# Patient Record
Sex: FEMALE | Race: WHITE | NOT HISPANIC OR LATINO | Employment: FULL TIME | URBAN - METROPOLITAN AREA
[De-identification: names, ages, dates, MRNs, and addresses within clinical notes are randomized per-mention and may not be internally consistent; named-entity substitution may affect disease eponyms.]

---

## 2019-10-30 ENCOUNTER — OFFICE VISIT (OUTPATIENT)
Dept: FAMILY MEDICINE CLINIC | Facility: CLINIC | Age: 24
End: 2019-10-30

## 2019-10-30 VITALS
OXYGEN SATURATION: 98 % | BODY MASS INDEX: 39.45 KG/M2 | WEIGHT: 236.8 LBS | HEIGHT: 65 IN | TEMPERATURE: 98.7 F | SYSTOLIC BLOOD PRESSURE: 133 MMHG | HEART RATE: 64 BPM | DIASTOLIC BLOOD PRESSURE: 89 MMHG

## 2019-10-30 DIAGNOSIS — E66.09 CLASS 2 OBESITY DUE TO EXCESS CALORIES WITHOUT SERIOUS COMORBIDITY WITH BODY MASS INDEX (BMI) OF 39.0 TO 39.9 IN ADULT: ICD-10-CM

## 2019-10-30 DIAGNOSIS — L20.82 FLEXURAL ECZEMA: ICD-10-CM

## 2019-10-30 DIAGNOSIS — R10.11 RUQ ABDOMINAL PAIN: Primary | ICD-10-CM

## 2019-10-30 DIAGNOSIS — H02.9 LESION OF EYELID: ICD-10-CM

## 2019-10-30 PROCEDURE — 99214 OFFICE O/P EST MOD 30 MIN: CPT | Performed by: FAMILY MEDICINE

## 2019-10-30 NOTE — PROGRESS NOTES
Subjective:     Sara Powers is a 24 y.o. female who presents for  Chief Complaint   Patient presents with   • Abdominal Pain     abdominal pain and thinks it may be her gallbladder   • Mass     she has bumps under her skin on her arm and in her groin- not painful but getting bigger- she also has a bump near her right eye- she is not sure if they are all the same thing       This is a new patient to me.    Patient presents to the office at the suggestion of her OB/GYN.  Patient reports right upper quadrant abdominal pain with palpation.  Pain is otherwise unnoticeable.    Additionally patient reports a painless, singular umbilicated lesion on her right inner eye lid that has been present for over a year. Denies any changes to the lesion or drainage from the lesion.       Abdominal Pain   This is a new problem. The current episode started more than 1 month ago (2 month). The onset quality is sudden. The problem occurs intermittently. The problem has been unchanged. The pain is located in the RUQ. The quality of the pain is aching (with palpation of abdomen). The abdominal pain does not radiate. Pertinent negatives include no arthralgias, constipation, diarrhea, dysuria, fever, hematuria, myalgias, nausea or vomiting. The pain is aggravated by palpation. Relieved by: avoiding palpation. She has tried nothing for the symptoms.   Cyst   This is a chronic problem. The current episode started more than 1 year ago. The problem occurs constantly. The problem has been unchanged. Associated symptoms include abdominal pain. Pertinent negatives include no arthralgias, chest pain, chills, congestion, coughing, diaphoresis, fatigue, fever, myalgias, nausea, rash, sore throat or vomiting. Nothing aggravates the symptoms. She has tried nothing for the symptoms.       Preventative:  PHQ-9 Depression Screening  Little interest or pleasure in doing things? 0   Feeling down, depressed, or hopeless? 0   Trouble falling or staying  asleep, or sleeping too much?     Feeling tired or having little energy?     Poor appetite or overeating?     Feeling bad about yourself - or that you are a failure or have let yourself or your family down?     Trouble concentrating on things, such as reading the newspaper or watching television?     Moving or speaking so slowly that other people could have noticed? Or the opposite - being so fidgety or restless that you have been moving around a lot more than usual?     Thoughts that you would be better off dead, or of hurting yourself in some way?     PHQ-9 Total Score 0   If you checked off any problems, how difficult have these problems made it for you to do your work, take care of things at home, or get along with other people?       Health Maintenance   Topic Date Due   • ANNUAL PHYSICAL  03/13/1999   • HPV VACCINES (1 - Female 3-dose series) 03/13/2011   • TDAP/TD VACCINES (1 - Tdap) 03/13/2015   • INFLUENZA VACCINE  08/01/2019   • CHLAMYDIA SCREENING  10/30/2019   • PAP SMEAR  10/30/2019       Past Medical Hx:  History reviewed. No pertinent past medical history.    Past Surgical Hx:  Past Surgical History:   Procedure Laterality Date   • APPENDECTOMY         Family Hx:  Family History   Problem Relation Age of Onset   • Diabetes Maternal Aunt         Social History:  Social History     Socioeconomic History   • Marital status: Single     Spouse name: Not on file   • Number of children: Not on file   • Years of education: Not on file   • Highest education level: Not on file   Tobacco Use   • Smoking status: Never Smoker   • Smokeless tobacco: Never Used   Substance and Sexual Activity   • Alcohol use: Yes     Frequency: Never     Comment: social drinker   • Drug use: No       Allergies:  Patient has no known allergies.    Current Meds:  No current outpatient medications on file.    The following portions of the patient's history were reviewed and updated as appropriate: allergies, current medications, past  "family history, past medical history, past social history, past surgical history and problem list.    Review of Systems  Review of Systems   Constitutional: Negative for chills, diaphoresis, fatigue and fever.   HENT: Negative for congestion, rhinorrhea, sinus pressure, sneezing and sore throat.    Eyes: Negative for blurred vision, double vision and redness.   Respiratory: Negative for cough, shortness of breath and wheezing.    Cardiovascular: Negative for chest pain and leg swelling.   Gastrointestinal: Positive for abdominal pain. Negative for constipation, diarrhea, nausea and vomiting.   Endocrine: Negative for polydipsia, polyphagia and polyuria.   Genitourinary: Negative for difficulty urinating, dysuria and hematuria.   Musculoskeletal: Negative for arthralgias, gait problem and myalgias.   Skin: Positive for skin lesions (right eye lid). Negative for rash.   Neurological: Negative for tremors, seizures, syncope and headache.   Psychiatric/Behavioral: Negative for sleep disturbance and depressed mood. The patient is not nervous/anxious.        Objective:     /89 (BP Location: Right arm, Patient Position: Sitting, Cuff Size: Large Adult)   Pulse 64   Temp 98.7 °F (37.1 °C) (Oral)   Ht 165.1 cm (65\")   Wt 107 kg (236 lb 12.8 oz)   SpO2 98%   BMI 39.41 kg/m²     Physical Exam   Constitutional: She is oriented to person, place, and time. She appears well-developed and well-nourished. No distress. She is morbidly obese.  HENT:   Head: Normocephalic and atraumatic.   Right Ear: Tympanic membrane and ear canal normal.   Left Ear: Tympanic membrane and ear canal normal.   Nose: Nose normal.   Mouth/Throat: Oropharynx is clear and moist and mucous membranes are normal.   Eyes: Conjunctivae and EOM are normal. Pupils are equal, round, and reactive to light. No scleral icterus.   Neck: Neck supple. No tracheal deviation present. No thyromegaly present.   Cardiovascular: Normal rate, regular rhythm, normal " heart sounds and intact distal pulses.   Pulmonary/Chest: Effort normal and breath sounds normal.   Abdominal: Soft. Bowel sounds are normal. There is tenderness in the right upper quadrant and right lower quadrant.   Lymphadenopathy:     She has no cervical adenopathy.   Neurological: She is alert and oriented to person, place, and time.   Skin: Skin is warm and dry. Capillary refill takes less than 2 seconds. Lesion (skin color, pinpoint lession medial to the right eye) noted. No rash noted. She is not diaphoretic. There is erythema (palmar aspect of hands).   Lipoma of the left upper arm; desquamation of the hands   Psychiatric: She has a normal mood and affect. Her behavior is normal.   Vitals reviewed.    No results found for: WBC, HGB, HCT, MCV, PLT  No results found for: GLUCOSE, BUN, CREATININE, EGFRIFNONA, EGFRIFAFRI, BCR, K, CO2, CALCIUM, PROTENTOTREF, ALBUMIN, LABIL2, AST, ALT     Assessment/Plan:     Sara was seen today for abdominal pain and mass.    Diagnoses and all orders for this visit:    RUQ abdominal pain  -     US Gallbladder; Future  - Patient's presentation concerning for gallstones.  Discussed management of gallstones with patient.  If patient is asymptomatic would hold on any invasive procedures at this time.    Lesion of eyelid  -     Ambulatory Referral to Dermatology  - Lesion concerning for molluscum contagiosum versus squamous papilloma.    Flexural eczema  -     triamcinolone (KENALOG) 0.1 % ointment; Apply  topically to the appropriate area as directed 2 (Two) Times a Day.  - Encouraged moisturizing skin with non-scented lotion.    Class 2 obesity due to excess calories without serious comorbidity with body mass index (BMI) of 39.0 to 39.9 in adult  Discussed health risks associate with obesity.  Encouraged 30 with moderate intense activity at least 5 days a week.  Patient provided with educational material regarding lifestyle modifications.    Follow-up:     Return in about 6 months  (around 4/30/2020) for Annual physical.      Signature    Glenna Dodd MD  Family Medicine  [facility]        This document has been electronically signed by Glenna Dodd MD on October 30, 2019 11:34 AM

## 2019-10-30 NOTE — PATIENT INSTRUCTIONS
Preventive Care for Young Adults, Female  The transition to life after high school as a young adult can be a stressful time with many changes. You may start seeing a primary care physician instead of a pediatrician. This is the time when your health care becomes your responsibility.  Preventive care refers to lifestyle choices and visits with your health care provider that can promote health and wellness.  What does preventive care include?  · A yearly physical exam. This is also called an annual wellness visit.  · Dental exams once or twice a year.  · Routine eye exams. Ask your health care provider how often you should have your eyes checked.  · Personal lifestyle choices, including:  ? Daily care of your teeth and gums.  ? Regular physical activity.  ? Eating a healthy diet.  ? Avoiding tobacco and drug use.  ? Avoiding or limiting alcohol use.  ? Practicing safe sex.  ? Taking vitamin and mineral supplements as recommended by your health care provider.  What happens during an annual wellness visit?  Preventive care starts with a yearly visit to your primary care physician. The services and screenings done by your health care provider during your annual wellness visit will depend on your overall health, lifestyle risk factors, and family history of disease.  Counseling  Your health care provider may ask you questions about:  · Past medical problems and your family’s medical history.  · Medicines or supplements you take.  · Health insurance and access to health care.  · Alcohol, tobacco, and drug use.  · Your safety at home, work, or school.  · Access to firearms.  · Emotional well-being and how you cope with stress.  · Relationship well-being.  · Diet, exercise, and sleep habits.  · Your sexual health and activity.  · Your methods of birth control.  · Your menstrual cycle.  · Your pregnancy history.  Screening  You may have the following tests or measurements:  · Height, weight, and BMI.  · Blood  pressure.  · Lipid and cholesterol levels.  · Tuberculosis skin test.  · Skin exam.  · Vision and hearing tests.  · Screening test for hepatitis.  · Screening tests for sexually transmitted diseases (STDs), if you are at risk.  · BRCA-related cancer screening. This may be done if you have a family history of breast, ovarian, tubal, or peritoneal cancers.  · Pelvic exam and Pap test. This may be done every 3 years starting at age 21.  Vaccines  Your health care provider may recommend certain vaccines, such as:  · Influenza vaccine. This is recommended every year.  · Tetanus, diphtheria, and acellular pertussis (Tdap, Td) vaccine. You may need a Td booster every 10 years.  · Varicella vaccine. You may need this if you have not been vaccinated.  · HPV vaccine. If you are 26 or younger, you may need three doses over 6 months.  · Measles, mumps, and rubella (MMR) vaccine. You may need at least one dose of MMR. You may also need a second dose.  · Pneumococcal 13-valent conjugate (PCV13) vaccine. You may need this if you have certain conditions and were not previously vaccinated.  · Pneumococcal polysaccharide (PPSV23) vaccine. You may need one or two doses if you smoke cigarettes or if you have certain conditions.  · Meningococcal vaccine. One dose is recommended if you are age 19-21 years and a first-year college student living in a residence bass, or if you have one of several medical conditions. You may also need additional booster doses.  · Hepatitis A vaccine. You may need this if you have certain conditions or if you travel or work in places where you may be exposed to hepatitis A.  · Hepatitis B vaccine. You may need this if you have certain conditions or if you travel or work in places where you may be exposed to hepatitis B.  · Haemophilus influenzae type b (Hib) vaccine. You may need this if you have certain risk factors.  Talk to your health care provider about which screenings and vaccines you need and how  often you need them.  What steps can I take to develop healthy behaviors?         · Have regular preventive health care visits with your primary care physician and dentist.  · Eat a healthy diet.  · Drink enough fluid to keep your urine pale yellow.  · Stay active. Exercise at least 30 minutes 5 or more days of the week.  · Use alcohol responsibly.  · Maintain a healthy weight.  · Do not use any products that contain nicotine, such as cigarettes, chewing tobacco, and e-cigarettes. If you need help quitting, ask your health care provider.  · Do not use drugs.  · Practice safe sex.  · Use birth control (contraception) to prevent unwanted pregnancy. If you plan to become pregnant, see your health care provider for a pre-conception visit.  · Find healthy ways to manage stress.  How can I protect myself from injury?  Injuries from violence or accidents are the leading cause of death among young adults and can often be prevented. Take these steps to help protect yourself:  · Always wear your seat belt while driving or riding in a vehicle.  · Do not drive if you have been drinking alcohol. Do not ride with someone who has been drinking.  · Do not drive when you are tired or distracted. Do not text while driving.  · Wear a helmet and other protective equipment during sports activities.  · If you have firearms in your house, make sure you follow all gun safety procedures.  · Seek help if you have been bullied, physically abused, or sexually abused.  · Use the Internet responsibly to avoid dangers such as online bullying and online sexual predators.  What can I do to cope with stress?  Young adults may face many new challenges that can be stressful, such as finding a job, going to college, moving away from home, managing money, being in a relationship, getting , and having children. To manage stress:  · Avoid known stressful situations when you can.  · Exercise regularly.  · Find a stress-reducing activity that works  best for you. Examples include meditation, yoga, listening to music, or reading.  · Spend time in nature.  · Keep a journal to write about your stress and how you respond.  · Talk to your health care provider about stress. He or she may suggest counseling.  · Spend time with supportive friends or family.  · Do not cope with stress by:  ? Drinking alcohol or using drugs.  ? Smoking cigarettes.  ? Eating.  Where can I get more information?  Learn more about preventive care and healthy habits from:  · American College of Obstetricians and Gynecologists: www.acog.org/Patients  · U.S. Preventive Services Task Force: www.uspreventiveservicestaskforce.org/Tools/ConsumerInfo/Index/information-for-consumers  · National Adolescent and Young Adult Health Information Center: http://nahic.[facility]/resource-center/  · American Academy of Pediatrics Bright Futures: https://brightfutures.aap.org  · Society for Adolescent Health and Medicine: www.adolescenthealth.org/Resources/Clinical-Care-Resources/Mental-Health/Mental-Health-Resources-For-Adolesc.aspx  · HealthCare.gov: www.healthcare.gov/young-adults/coverage/  This information is not intended to replace advice given to you by your health care provider. Make sure you discuss any questions you have with your health care provider.  Document Released: 05/04/2017 Document Revised: 07/31/2018 Document Reviewed: 05/04/2017  Elsevier Interactive Patient Education © 2019 [employer]

## 2019-11-08 ENCOUNTER — APPOINTMENT (OUTPATIENT)
Dept: ULTRASOUND IMAGING | Facility: HOSPITAL | Age: 24
End: 2019-11-08

## 2020-01-02 ENCOUNTER — TELEPHONE (OUTPATIENT)
Dept: FAMILY MEDICINE CLINIC | Facility: CLINIC | Age: 25
End: 2020-01-02

## 2020-01-02 RX ORDER — OMEPRAZOLE 20 MG/1
20 CAPSULE, DELAYED RELEASE ORAL
Qty: 30 CAPSULE | Refills: 1 | Status: SHIPPED | OUTPATIENT
Start: 2020-01-02 | End: 2020-02-03 | Stop reason: SDUPTHER

## 2020-01-02 NOTE — TELEPHONE ENCOUNTER
Patient will try Omeprazole and avoiding said foods/drinks. She will hold off on labs for now and will call with an update.

## 2020-01-02 NOTE — TELEPHONE ENCOUNTER
Pt knows ultrasound showed no evidence of gallstones but she continues to have stomach pain and nausea every morning and random times throughout the day. She is concerned pancreatitis. Please advise.

## 2020-02-03 ENCOUNTER — OFFICE VISIT (OUTPATIENT)
Dept: FAMILY MEDICINE CLINIC | Facility: CLINIC | Age: 25
End: 2020-02-03

## 2020-02-03 VITALS
WEIGHT: 233 LBS | HEART RATE: 99 BPM | OXYGEN SATURATION: 99 % | TEMPERATURE: 99 F | BODY MASS INDEX: 38.82 KG/M2 | DIASTOLIC BLOOD PRESSURE: 87 MMHG | HEIGHT: 65 IN | SYSTOLIC BLOOD PRESSURE: 127 MMHG

## 2020-02-03 DIAGNOSIS — E66.01 CLASS 2 SEVERE OBESITY DUE TO EXCESS CALORIES WITH SERIOUS COMORBIDITY AND BODY MASS INDEX (BMI) OF 38.0 TO 38.9 IN ADULT (HCC): ICD-10-CM

## 2020-02-03 DIAGNOSIS — R10.13 DYSPEPSIA: Primary | ICD-10-CM

## 2020-02-03 DIAGNOSIS — K21.9 GASTROESOPHAGEAL REFLUX DISEASE WITHOUT ESOPHAGITIS: ICD-10-CM

## 2020-02-03 LAB
ALBUMIN SERPL-MCNC: 4.1 G/DL (ref 3.5–5.2)
ALBUMIN/GLOB SERPL: 1.4 G/DL
ALP SERPL-CCNC: 34 U/L (ref 39–117)
ALT SERPL W P-5'-P-CCNC: 10 U/L (ref 1–33)
ANION GAP SERPL CALCULATED.3IONS-SCNC: 14.3 MMOL/L (ref 5–15)
AST SERPL-CCNC: 14 U/L (ref 1–32)
BILIRUB SERPL-MCNC: <0.2 MG/DL (ref 0.2–1.2)
BUN BLD-MCNC: 8 MG/DL (ref 6–20)
BUN/CREAT SERPL: 10.5 (ref 7–25)
CALCIUM SPEC-SCNC: 8.7 MG/DL (ref 8.6–10.5)
CHLORIDE SERPL-SCNC: 99 MMOL/L (ref 98–107)
CHOLEST SERPL-MCNC: 134 MG/DL (ref 0–200)
CO2 SERPL-SCNC: 23.7 MMOL/L (ref 22–29)
CREAT BLD-MCNC: 0.76 MG/DL (ref 0.57–1)
GFR SERPL CREATININE-BSD FRML MDRD: 94 ML/MIN/1.73
GLOBULIN UR ELPH-MCNC: 3 GM/DL
GLUCOSE BLD-MCNC: 86 MG/DL (ref 65–99)
HDLC SERPL-MCNC: 41 MG/DL (ref 40–60)
LDLC SERPL CALC-MCNC: 71 MG/DL (ref 0–100)
LDLC/HDLC SERPL: 1.72 {RATIO}
LIPASE SERPL-CCNC: 19 U/L (ref 13–60)
POTASSIUM BLD-SCNC: 3.9 MMOL/L (ref 3.5–5.2)
PROT SERPL-MCNC: 7.1 G/DL (ref 6–8.5)
SODIUM BLD-SCNC: 137 MMOL/L (ref 136–145)
TRIGL SERPL-MCNC: 112 MG/DL (ref 0–150)
VLDLC SERPL-MCNC: 22.4 MG/DL (ref 5–40)

## 2020-02-03 PROCEDURE — 83690 ASSAY OF LIPASE: CPT | Performed by: FAMILY MEDICINE

## 2020-02-03 PROCEDURE — 36415 COLL VENOUS BLD VENIPUNCTURE: CPT | Performed by: FAMILY MEDICINE

## 2020-02-03 PROCEDURE — 80053 COMPREHEN METABOLIC PANEL: CPT | Performed by: FAMILY MEDICINE

## 2020-02-03 PROCEDURE — 80061 LIPID PANEL: CPT | Performed by: FAMILY MEDICINE

## 2020-02-03 PROCEDURE — 99213 OFFICE O/P EST LOW 20 MIN: CPT | Performed by: FAMILY MEDICINE

## 2020-02-03 RX ORDER — OMEPRAZOLE 40 MG/1
40 CAPSULE, DELAYED RELEASE ORAL
Qty: 90 CAPSULE | Refills: 1 | Status: SHIPPED | OUTPATIENT
Start: 2020-02-03

## 2020-02-03 RX ORDER — NORGESTIMATE AND ETHINYL ESTRADIOL 0.25-0.035
1 KIT ORAL DAILY
COMMUNITY
Start: 2019-12-26 | End: 2020-04-30 | Stop reason: SDUPTHER

## 2020-02-03 NOTE — PATIENT INSTRUCTIONS
Preventive Care 21-39 Years Old, Female  Preventive care refers to visits with your health care provider and lifestyle choices that can promote health and wellness. This includes:  · A yearly physical exam. This may also be called an annual well check.  · Regular dental visits and eye exams.  · Immunizations.  · Screening for certain conditions.  · Healthy lifestyle choices, such as eating a healthy diet, getting regular exercise, not using drugs or products that contain nicotine and tobacco, and limiting alcohol use.  What can I expect for my preventive care visit?  Physical exam  Your health care provider will check your:  · Height and weight. This may be used to calculate body mass index (BMI), which tells if you are at a healthy weight.  · Heart rate and blood pressure.  · Skin for abnormal spots.  Counseling  Your health care provider may ask you questions about your:  · Alcohol, tobacco, and drug use.  · Emotional well-being.  · Home and relationship well-being.  · Sexual activity.  · Eating habits.  · Work and work environment.  · Method of birth control.  · Menstrual cycle.  · Pregnancy history.  What immunizations do I need?    Influenza (flu) vaccine  · This is recommended every year.  Tetanus, diphtheria, and pertussis (Tdap) vaccine  · You may need a Td booster every 10 years.  Varicella (chickenpox) vaccine  · You may need this if you have not been vaccinated.  Human papillomavirus (HPV) vaccine  · If recommended by your health care provider, you may need three doses over 6 months.  Measles, mumps, and rubella (MMR) vaccine  · You may need at least one dose of MMR. You may also need a second dose.  Meningococcal conjugate (MenACWY) vaccine  · One dose is recommended if you are age 19-21 years and a first-year college student living in a residence bass, or if you have one of several medical conditions. You may also need additional booster doses.  Pneumococcal conjugate (PCV13) vaccine  · You may need  this if you have certain conditions and were not previously vaccinated.  Pneumococcal polysaccharide (PPSV23) vaccine  · You may need one or two doses if you smoke cigarettes or if you have certain conditions.  Hepatitis A vaccine  · You may need this if you have certain conditions or if you travel or work in places where you may be exposed to hepatitis A.  Hepatitis B vaccine  · You may need this if you have certain conditions or if you travel or work in places where you may be exposed to hepatitis B.  Haemophilus influenzae type b (Hib) vaccine  · You may need this if you have certain conditions.  You may receive vaccines as individual doses or as more than one vaccine together in one shot (combination vaccines). Talk with your health care provider about the risks and benefits of combination vaccines.  What tests do I need?    Blood tests  · Lipid and cholesterol levels. These may be checked every 5 years starting at age 20.  · Hepatitis C test.  · Hepatitis B test.  Screening  · Diabetes screening. This is done by checking your blood sugar (glucose) after you have not eaten for a while (fasting).  · Sexually transmitted disease (STD) testing.  · BRCA-related cancer screening. This may be done if you have a family history of breast, ovarian, tubal, or peritoneal cancers.  · Pelvic exam and Pap test. This may be done every 3 years starting at age 21. Starting at age 30, this may be done every 5 years if you have a Pap test in combination with an HPV test.  Talk with your health care provider about your test results, treatment options, and if necessary, the need for more tests.  Follow these instructions at home:  Eating and drinking    · Eat a diet that includes fresh fruits and vegetables, whole grains, lean protein, and low-fat dairy.  · Take vitamin and mineral supplements as recommended by your health care provider.  · Do not drink alcohol if:  ? Your health care provider tells you not to drink.  ? You are  pregnant, may be pregnant, or are planning to become pregnant.  · If you drink alcohol:  ? Limit how much you have to 0-1 drink a day.  ? Be aware of how much alcohol is in your drink. In the [place] one drink equals one 12 oz bottle of beer (355 mL), one 5 oz glass of wine (148 mL), or one 1½ oz glass of hard liquor (44 mL).  Lifestyle  · Take daily care of your teeth and gums.  · Stay active. Exercise for at least 30 minutes on 5 or more days each week.  · Do not use any products that contain nicotine or tobacco, such as cigarettes, e-cigarettes, and chewing tobacco. If you need help quitting, ask your health care provider.  · If you are sexually active, practice safe sex. Use a condom or other form of birth control (contraception) in order to prevent pregnancy and STIs (sexually transmitted infections). If you plan to become pregnant, see your health care provider for a preconception visit.  What's next?  · Visit your health care provider once a year for a well check visit.  · Ask your health care provider how often you should have your eyes and teeth checked.  · Stay up to date on all vaccines.  This information is not intended to replace advice given to you by your health care provider. Make sure you discuss any questions you have with your health care provider.  Document Released: 02/13/2003 Document Revised: 08/29/2019 Document Reviewed: 08/29/2019  [employer] Interactive Patient Education © 2019 [employer]      Food Choices for Gastroesophageal Reflux Disease, Adult  When you have gastroesophageal reflux disease (GERD), the foods you eat and your eating habits are very important. Choosing the right foods can help ease your discomfort. Think about working with a nutrition specialist (dietitian) to help you make good choices.  What are tips for following this plan?    Meals  · Choose healthy foods that are low in fat, such as fruits, vegetables, whole grains, low-fat dairy products, and lean meat, fish, and  poultry.  · Eat small meals often instead of 3 large meals a day. Eat your meals slowly, and in a place where you are relaxed. Avoid bending over or lying down until 2-3 hours after eating.  · Avoid eating meals 2-3 hours before bed.  · Avoid drinking a lot of liquid with meals.  · Cook foods using methods other than frying. Bake, grill, or broil food instead.  · Avoid or limit:  ? Chocolate.  ? Peppermint or spearmint.  ? Alcohol.  ? Pepper.  ? Black and decaffeinated coffee.  ? Black and decaffeinated tea.  ? Bubbly (carbonated) soft drinks.  ? Caffeinated energy drinks and soft drinks.  · Limit high-fat foods such as:  ? Fatty meat or fried foods.  ? Whole milk, cream, butter, or ice cream.  ? Nuts and nut butters.  ? Pastries, donuts, and sweets made with butter or shortening.  · Avoid foods that cause symptoms. These foods may be different for everyone. Common foods that cause symptoms include:  ? Tomatoes.  ? Oranges, suad, and limes.  ? Peppers.  ? Spicy food.  ? Onions and garlic.  ? Vinegar.  Lifestyle  · Maintain a healthy weight. Ask your doctor what weight is healthy for you. If you need to lose weight, work with your doctor to do so safely.  · Exercise for at least 30 minutes for 5 or more days each week, or as told by your doctor.  · Wear loose-fitting clothes.  · Do not smoke. If you need help quitting, ask your doctor.  · Sleep with the head of your bed higher than your feet. Use a wedge under the mattress or blocks under the bed frame to raise the head of the bed.  Summary  · When you have gastroesophageal reflux disease (GERD), food and lifestyle choices are very important in easing your symptoms.  · Eat small meals often instead of 3 large meals a day. Eat your meals slowly, and in a place where you are relaxed.  · Limit high-fat foods such as fatty meat or fried foods.  · Avoid bending over or lying down until 2-3 hours after eating.  · Avoid peppermint and spearmint, caffeine, alcohol, and  chocolate.  This information is not intended to replace advice given to you by your health care provider. Make sure you discuss any questions you have with your health care provider.  Document Released: 06/18/2013 Document Revised: 01/23/2018 Document Reviewed: 01/23/2018  [employer] Interactive Patient Education © 2019 [employer]

## 2020-02-03 NOTE — PROGRESS NOTES
Subjective:     Sara Powers is a 24 y.o. female who presents for  Chief Complaint   Patient presents with   • Heartburn     she is in for follow up on acid reflux        This is a known patient to me.     Presents for follow-up of abdominal pain. RUQ ultrasound was negative for gallbladder disease. Patient was started on omeprazole 20 mg for dyspepsia. Reports some relief but is concerned of other potential pathological abdominal process.     Heartburn   She complains of abdominal pain, nausea and water brash. She reports no chest pain, no coughing, no sore throat or no wheezing. This is a chronic problem. The current episode started more than 1 month ago. The problem occurs constantly. The problem has been unchanged. The symptoms are aggravated by certain foods, tight clothes and caffeine. Pertinent negatives include no fatigue. She has tried a PPI for the symptoms. The treatment provided mild relief. Past procedures include an abdominal ultrasound.       Preventative:  Health Maintenance   Topic Date Due   • ANNUAL PHYSICAL  03/13/1999   • HPV VACCINES (1 - Female 2-dose series) 03/13/2007   • TDAP/TD VACCINES (1 - Tdap) 03/13/2007   • INFLUENZA VACCINE  08/01/2019   • CHLAMYDIA SCREENING  10/30/2019   • PAP SMEAR  10/30/2019       Past Medical Hx:  Past Medical History:   Diagnosis Date   • Allergic rhinitis     NOS   • Contraceptive surveillance     NEC   • Eczema    • Pregnancy examination or test, negative result    • Urinary tract infection     NOS   • Urticaria        Past Surgical Hx:  Past Surgical History:   Procedure Laterality Date   • APPENDECTOMY         Family Hx:  Family History   Problem Relation Age of Onset   • Diabetes Maternal Aunt         Social History:  Social History     Socioeconomic History   • Marital status: Single     Spouse name: Not on file   • Number of children: Not on file   • Years of education: Not on file   • Highest education level: Not on file   Tobacco Use   • Smoking  status: Never Smoker   • Smokeless tobacco: Never Used   Substance and Sexual Activity   • Alcohol use: Yes     Frequency: Never     Comment: social drinker   • Drug use: No       Allergies:  Patient has no known allergies.    Current Meds:    Current Outpatient Medications:   •  ESTARYLLA 0.25-35 MG-MCG per tablet, Take 1 tablet by mouth Daily., Disp: , Rfl:   •  omeprazole (priLOSEC) 20 MG capsule, Take 1 capsule by mouth Every Morning Before Breakfast. Take on empty stomach!, Disp: 30 capsule, Rfl: 1  •  triamcinolone (KENALOG) 0.1 % ointment, Apply  topically to the appropriate area as directed 2 (Two) Times a Day., Disp: 80 g, Rfl: 5    The following portions of the patient's history were reviewed and updated as appropriate: allergies, current medications, past family history, past medical history, past social history, past surgical history and problem list.    Review of Systems  Review of Systems   Constitutional: Negative for chills, diaphoresis, fatigue and fever.   HENT: Negative for congestion, rhinorrhea, sinus pressure, sneezing and sore throat.    Eyes: Negative for blurred vision, double vision and redness.   Respiratory: Negative for cough, shortness of breath and wheezing.    Cardiovascular: Negative for chest pain and leg swelling.   Gastrointestinal: Positive for abdominal pain and nausea. Negative for constipation, diarrhea and vomiting.   Endocrine: Negative for polydipsia, polyphagia and polyuria.   Genitourinary: Negative for difficulty urinating, dysuria, hematuria and urinary incontinence.   Musculoskeletal: Negative for arthralgias, gait problem and myalgias.   Skin: Negative for rash and skin lesions.   Neurological: Negative for tremors, seizures, syncope and headache.   Psychiatric/Behavioral: Negative for sleep disturbance and depressed mood. The patient is not nervous/anxious.        Objective:     /87 (BP Location: Left arm, Patient Position: Sitting, Cuff Size: Large Adult)    "Pulse 99   Temp 99 °F (37.2 °C) (Oral)   Ht 165.1 cm (65\")   Wt 106 kg (233 lb)   SpO2 99%   BMI 38.77 kg/m²     Physical Exam   Constitutional: She is oriented to person, place, and time. She appears well-developed and well-nourished. No distress. She is obese.  HENT:   Head: Normocephalic and atraumatic.   Right Ear: Tympanic membrane and ear canal normal.   Left Ear: Tympanic membrane and ear canal normal.   Nose: Nose normal.   Mouth/Throat: Oropharynx is clear and moist and mucous membranes are normal.   Eyes: Pupils are equal, round, and reactive to light. Conjunctivae and EOM are normal. No scleral icterus.   Neck: Neck supple. No tracheal deviation present. No thyromegaly present.   Cardiovascular: Normal rate, regular rhythm, normal heart sounds and intact distal pulses.   Pulmonary/Chest: Effort normal and breath sounds normal.   Abdominal: Soft. Bowel sounds are normal. There is tenderness in the right upper quadrant and epigastric area.   Lymphadenopathy:     She has no cervical adenopathy.   Neurological: She is alert and oriented to person, place, and time.   Skin: Skin is warm and dry. Capillary refill takes less than 2 seconds. No rash noted. She is not diaphoretic. No erythema.   Psychiatric: She has a normal mood and affect. Her behavior is normal.   Vitals reviewed.    No results found for: WBC, HGB, HCT, MCV, PLT  No results found for: GLUCOSE, BUN, CREATININE, EGFRIFNONA, EGFRIFAFRI, BCR, K, CO2, CALCIUM, PROTENTOTREF, ALBUMIN, LABIL2, BILIRUBIN, AST, ALT     Assessment/Plan:     Sara was seen today for heartburn.    Diagnoses and all orders for this visit:    Dyspepsia  Comments:  Reviewed RUQ ultrasound with patient.  Discussed GERD appropriate diet.  Encouraged small frequent meals & weight loss.   Increase PPI.  Orders:  -     omeprazole (priLOSEC) 40 MG capsule; Take 1 capsule by mouth Every Morning Before Breakfast. Take on empty stomach!  -     Comprehensive Metabolic Panel  -     " Lipase    Gastroesophageal reflux disease without esophagitis  Comments:  Reviewed RUQ ultrasound with patient.  Discussed GERD appropriate diet.  Encouraged small frequent meals & weight loss.   Increase PPI.  Orders:  -     omeprazole (priLOSEC) 40 MG capsule; Take 1 capsule by mouth Every Morning Before Breakfast. Take on empty stomach!  -     Comprehensive Metabolic Panel  -     Lipase    Class 2 severe obesity due to excess calories with serious comorbidity and body mass index (BMI) of 38.0 to 38.9 in adult (CMS/[facility])  Comments:  Discussed health risks associated with obesity.  Encouraged 150 minutes of exercise weekly.  Orders:  -     Lipid Panel      Follow-up:     Return for Keep appointment in April for Annual.      Signature    Glenna Dodd MD  Family Medicine  [facility]        This document has been electronically signed by Glenna Dodd MD on February 3, 2020 2:46 PM

## 2020-04-30 ENCOUNTER — TELEPHONE (OUTPATIENT)
Dept: FAMILY MEDICINE CLINIC | Facility: CLINIC | Age: 25
End: 2020-04-30

## 2020-04-30 DIAGNOSIS — R10.12 BILATERAL UPPER ABDOMINAL PAIN: ICD-10-CM

## 2020-04-30 DIAGNOSIS — R10.13 DYSPEPSIA: ICD-10-CM

## 2020-04-30 DIAGNOSIS — R10.11 BILATERAL UPPER ABDOMINAL PAIN: ICD-10-CM

## 2020-04-30 DIAGNOSIS — Z30.09 ENCOUNTER FOR COUNSELING REGARDING CONTRACEPTION: Primary | ICD-10-CM

## 2020-04-30 RX ORDER — NORGESTIMATE AND ETHINYL ESTRADIOL 0.25-0.035
1 KIT ORAL DAILY
Qty: 84 TABLET | Refills: 3 | Status: SHIPPED | OUTPATIENT
Start: 2020-04-30

## 2020-05-11 ENCOUNTER — TELEPHONE (OUTPATIENT)
Dept: FAMILY MEDICINE CLINIC | Facility: CLINIC | Age: 25
End: 2020-05-11

## 2020-05-11 DIAGNOSIS — J02.9 PHARYNGITIS, UNSPECIFIED ETIOLOGY: Primary | ICD-10-CM

## 2020-05-11 RX ORDER — AMOXICILLIN 500 MG/1
1000 CAPSULE ORAL DAILY
Qty: 20 CAPSULE | Refills: 0 | Status: SHIPPED | OUTPATIENT
Start: 2020-05-11 | End: 2020-05-21

## 2020-05-11 NOTE — TELEPHONE ENCOUNTER
Please call patient and let her know that strep throat typically resolves on its own.  However I have sent a prescription for amoxicillin to her pharmacy to help reduce the risk of future complications.

## 2021-01-05 ENCOUNTER — HOSPITAL ENCOUNTER (EMERGENCY)
Facility: HOSPITAL | Age: 26
Discharge: HOME | End: 2021-01-05
Admitting: EMERGENCY MEDICINE

## 2021-01-05 VITALS
WEIGHT: 242.4 LBS | HEIGHT: 65 IN | BODY MASS INDEX: 40.39 KG/M2 | DIASTOLIC BLOOD PRESSURE: 89 MMHG | HEART RATE: 91 BPM | OXYGEN SATURATION: 99 % | TEMPERATURE: 98.9 F | SYSTOLIC BLOOD PRESSURE: 149 MMHG | RESPIRATION RATE: 18 BRPM

## 2021-01-05 DIAGNOSIS — J02.0 STREP PHARYNGITIS: Primary | ICD-10-CM

## 2021-01-05 LAB
S PYO AG THROAT QL: POSITIVE
SARS-COV-2 RNA PNL SPEC NAA+PROBE: NORMAL

## 2021-01-05 PROCEDURE — C9803 HOPD COVID-19 SPEC COLLECT: HCPCS

## 2021-01-05 PROCEDURE — 96372 THER/PROPH/DIAG INJ SC/IM: CPT

## 2021-01-05 PROCEDURE — 87635 SARS-COV-2 COVID-19 AMP PRB: CPT | Performed by: NURSE PRACTITIONER

## 2021-01-05 PROCEDURE — 87651 STREP A DNA AMP PROBE: CPT | Performed by: NURSE PRACTITIONER

## 2021-01-05 PROCEDURE — 25010000002 PENICILLIN G BENZATHINE PER 1200000 UNITS: Performed by: NURSE PRACTITIONER

## 2021-01-05 PROCEDURE — 99283 EMERGENCY DEPT VISIT LOW MDM: CPT

## 2021-01-05 RX ADMIN — PENICILLIN G BENZATHINE 1.2 MILLION UNITS: 1200000 INJECTION, SUSPENSION INTRAMUSCULAR at 15:54

## 2021-01-05 NOTE — DISCHARGE INSTRUCTIONS
Please follow-up your primary care provider, call for an appointment  Return the ED for worsening symptoms  Change toothbrush in 48 hours  Warm salt water gargles 3-4 times per day  May Use Tylenol and or ibuprofen over-the-counter for pain

## 2021-01-05 NOTE — ED PROVIDER NOTES
Subjective   Patient is a 25-year-old female no significant medical history presents emergency department with complaint of sore throat, right ear pain.  This began 1 day ago.  She reports her throat is worse with painful swallowing.  Denies any fever.  She is not had any nausea vomiting or diarrhea.  No abdominal pain.  Headaches.          Review of Systems   Constitutional: Negative for chills and fever.   HENT: Positive for sore throat.    Respiratory: Negative for shortness of breath.    Cardiovascular: Negative for chest pain.   Gastrointestinal: Negative for abdominal pain.   Skin: Negative for rash.   Neurological: Negative for headaches.       Past Medical History:   Diagnosis Date   • Allergic rhinitis     NOS   • Contraceptive surveillance     NEC   • Eczema    • Pregnancy examination or test, negative result    • Urinary tract infection     NOS   • Urticaria        No Known Allergies    Past Surgical History:   Procedure Laterality Date   • APPENDECTOMY         Family History   Problem Relation Age of Onset   • Diabetes Maternal Aunt        Social History     Socioeconomic History   • Marital status: Single     Spouse name: Not on file   • Number of children: Not on file   • Years of education: Not on file   • Highest education level: Not on file   Tobacco Use   • Smoking status: Never Smoker   • Smokeless tobacco: Never Used   Substance and Sexual Activity   • Alcohol use: Yes     Frequency: Never     Comment: social drinker   • Drug use: No           Objective   Physical Exam  Vitals signs and nursing note reviewed.   Constitutional:       General: She is not in acute distress.     Appearance: She is well-developed. She is not ill-appearing, toxic-appearing or diaphoretic.   HENT:      Head: Normocephalic and atraumatic.      Right Ear: Tympanic membrane and ear canal normal.      Left Ear: Tympanic membrane and ear canal normal.      Mouth/Throat:      Mouth: Mucous membranes are moist.      Pharynx:  "Uvula midline. Pharyngeal swelling, oropharyngeal exudate and posterior oropharyngeal erythema present. No uvula swelling.      Tonsils: Tonsillar exudate present. No tonsillar abscesses. 2+ on the right. 2+ on the left.   Eyes:      Conjunctiva/sclera: Conjunctivae normal.      Pupils: Pupils are equal, round, and reactive to light.   Neck:      Musculoskeletal: Normal range of motion and neck supple.   Pulmonary:      Effort: Pulmonary effort is normal.      Breath sounds: Normal breath sounds.   Abdominal:      General: Bowel sounds are normal.      Palpations: Abdomen is soft.   Lymphadenopathy:      Cervical: No cervical adenopathy.   Skin:     General: Skin is warm and dry.      Capillary Refill: Capillary refill takes less than 2 seconds.      Findings: No rash.   Neurological:      General: No focal deficit present.      Mental Status: She is alert and oriented to person, place, and time.   Psychiatric:         Mood and Affect: Mood normal.         Behavior: Behavior normal.         Procedures           ED Course  Appropriate PPE was worn during the duration of the care for this patient while in the emergency department per [facility] guidelines      /89 (BP Location: Left arm, Patient Position: Sitting)   Pulse 91   Temp 98.9 °F (37.2 °C) (Oral)   Resp 18   Ht 165.1 cm (65\")   Wt 110 kg (242 lb 6.4 oz)   LMP 12/24/2020 (Exact Date)   SpO2 99%   Breastfeeding No   BMI 40.34 kg/m²   Labs Reviewed   RAPID STREP A SCREEN - Abnormal; Notable for the following components:       Result Value    Strep A Ag Positive (*)     All other components within normal limits   COVID-19,ABBOTT IN-HOUSE,NASAL SWAB (NO TRANSPORT MEDIA) 2 HR TAT - Normal    Narrative:     Fact sheet for providers: https://www.fda.gov/media/509627/download     Fact sheet for patients: https://www.fda.gov/media/536316/download    Test performed by PCR.  If inconsistent with clinical signs and symptoms patient should be " tested with different authorized molecular test.     Medications   penicillin G benzathine (BICILLIN-LA) injection 1.2 Million Units (1.2 Million Units Intramuscular Given 1/5/21 5653)     No radiology results for the last day                                       MDM  Number of Diagnoses or Management Options  Strep pharyngitis:   Diagnosis management comments: Patient is a 25-year-old female was in the emergency department complaint of sore throat, she had the above exam and work-up.  She is positive for strep meningitis.  No evidence for abscess.  No evidence for airway compromise.  She is tolerating her oral secretions normally.  She will be given a shot of Bicillin, she will be advised follow-up primary care provider.    I spoke with the patient at the bedside regarding their plan of care, discharge instruction, home care, prescriptions, and importance follow-up.  We discussed test results at the bedside.  Patient was made aware of indications to return to the emergency department.  Patient agrees with the current plan of care for discharge, verbalized understanding of all instructions    Pt is aware that discharge does not mean that nothing is wrong but it indicates no emergency is present and they must continue care with follow-up as given below or physician of their choice       Amount and/or Complexity of Data Reviewed  Clinical lab tests: reviewed    Patient Progress  Patient progress: stable      Final diagnoses:   Strep pharyngitis            Sri Mabry, APRN  01/05/21 0304

## 2022-12-12 ENCOUNTER — HOSPITAL ENCOUNTER (OUTPATIENT)
Facility: HOSPITAL | Age: 27
Discharge: HOME | End: 2022-12-12
Attending: EMERGENCY MEDICINE | Admitting: EMERGENCY MEDICINE

## 2022-12-12 VITALS
OXYGEN SATURATION: 97 % | TEMPERATURE: 98.1 F | BODY MASS INDEX: 38.32 KG/M2 | RESPIRATION RATE: 16 BRPM | SYSTOLIC BLOOD PRESSURE: 145 MMHG | DIASTOLIC BLOOD PRESSURE: 93 MMHG | HEIGHT: 65 IN | WEIGHT: 230 LBS | HEART RATE: 73 BPM

## 2022-12-12 DIAGNOSIS — R10.30 LOWER ABDOMINAL PAIN: Primary | ICD-10-CM

## 2022-12-12 LAB
BILIRUB UR QL STRIP: NEGATIVE
CLARITY UR: CLEAR
COLOR UR: YELLOW
GLUCOSE UR STRIP-MCNC: NEGATIVE MG/DL
HGB UR QL STRIP.AUTO: NEGATIVE
KETONES UR QL STRIP: NEGATIVE
LEUKOCYTE ESTERASE UR QL STRIP.AUTO: NEGATIVE
NITRITE UR QL STRIP: NEGATIVE
PH UR STRIP.AUTO: <=5 [PH] (ref 5–8)
PROT UR QL STRIP: NEGATIVE
SP GR UR STRIP: >=1.03 (ref 1–1.03)
UROBILINOGEN UR QL STRIP: NORMAL

## 2022-12-12 PROCEDURE — 81003 URINALYSIS AUTO W/O SCOPE: CPT | Performed by: EMERGENCY MEDICINE

## 2022-12-12 PROCEDURE — 99203 OFFICE O/P NEW LOW 30 MIN: CPT | Performed by: EMERGENCY MEDICINE

## 2022-12-12 PROCEDURE — G0463 HOSPITAL OUTPT CLINIC VISIT: HCPCS | Performed by: EMERGENCY MEDICINE

## 2022-12-12 RX ORDER — BUPRENORPHINE HYDROCHLORIDE AND NALOXONE HYDROCHLORIDE DIHYDRATE 8; 2 MG/1; MG/1
1 TABLET SUBLINGUAL DAILY
COMMUNITY

## 2022-12-12 RX ORDER — IBUPROFEN 800 MG/1
800 TABLET ORAL EVERY 8 HOURS PRN
Qty: 30 TABLET | Refills: 0 | Status: SHIPPED | OUTPATIENT
Start: 2022-12-12

## 2022-12-12 RX ORDER — DICYCLOMINE HCL 20 MG
20 TABLET ORAL EVERY 8 HOURS PRN
Qty: 15 TABLET | Refills: 0 | Status: SHIPPED | OUTPATIENT
Start: 2022-12-12

## 2022-12-12 RX ORDER — DOXAZOSIN 2 MG/1
2 TABLET ORAL NIGHTLY
COMMUNITY

## 2022-12-12 NOTE — [facility] PROVIDER NOTE
Subjective   History of Present Illness  The patient is a 27-year-old female, who presents emergency room with complaints of lower abdominal pain.  Patient states that her pain has been present for the past couple of days.  She reports just finishing her period about 5 days ago.  Patient reports general cramping to her lower abdomen.  The pain radiates across the right and left lower quadrants.  Patient thinks that she might have a UTI.  She does report some dysuria.  She denies any fevers.  She denies any flank pain.  No nausea, vomiting or diarrhea.  She is here for evaluation.        Review of Systems   Constitutional: Negative.  Negative for chills, fatigue and fever.   Eyes: Negative.    Respiratory: Negative for cough, chest tightness and shortness of breath.    Cardiovascular: Negative for chest pain and palpitations.   Gastrointestinal: Positive for abdominal pain. Negative for diarrhea, nausea and vomiting.   Genitourinary: Positive for dysuria and pelvic pain. Negative for difficulty urinating, urgency, vaginal bleeding and vaginal discharge.   Musculoskeletal: Negative.    Skin: Negative.  Negative for rash.   Neurological: Negative.  Negative for syncope, weakness, numbness and headaches.   Psychiatric/Behavioral: Negative.    All other systems reviewed and are negative.      Past Medical History:   Diagnosis Date   • Allergic rhinitis     NOS   • Contraceptive surveillance     NEC   • Eczema    • Pregnancy examination or test, negative result    • Urinary tract infection     NOS   • Urticaria        No Known Allergies    Past Surgical History:   Procedure Laterality Date   • APPENDECTOMY         Family History   Problem Relation Age of Onset   • Diabetes Maternal Aunt        Social History     Socioeconomic History   • Marital status: Single   Tobacco Use   • Smoking status: Never   • Smokeless tobacco: Never   Substance and Sexual Activity   • Alcohol use: Yes     Comment: social drinker   • Drug use:  No           Objective   Physical Exam  Vitals and nursing note reviewed.   Constitutional:       General: She is not in acute distress.     Appearance: Normal appearance. She is normal weight.   HENT:      Head: Normocephalic and atraumatic.      Right Ear: External ear normal.      Left Ear: External ear normal.      Nose: Nose normal.      Mouth/Throat:      Mouth: Mucous membranes are moist.   Eyes:      Extraocular Movements: Extraocular movements intact.      Conjunctiva/sclera: Conjunctivae normal.      Pupils: Pupils are equal, round, and reactive to light.   Cardiovascular:      Rate and Rhythm: Normal rate and regular rhythm.      Pulses: Normal pulses.      Heart sounds: Normal heart sounds. No murmur heard.    No friction rub. No gallop.   Pulmonary:      Effort: Pulmonary effort is normal. No respiratory distress.      Breath sounds: Normal breath sounds.   Abdominal:      General: Abdomen is flat. There is no distension.      Tenderness: There is abdominal tenderness in the right lower quadrant, suprapubic area and left lower quadrant. There is no guarding or rebound. Negative signs include Avilez's sign, Rovsing's sign, McBurney's sign and psoas sign.   Musculoskeletal:         General: No deformity or signs of injury. Normal range of motion.      Cervical back: Normal range of motion and neck supple. No tenderness.   Skin:     General: Skin is warm.      Capillary Refill: Capillary refill takes less than 2 seconds.   Neurological:      General: No focal deficit present.      Mental Status: She is alert and oriented to person, place, and time. Mental status is at baseline.   Psychiatric:         Mood and Affect: Mood normal.         Procedures           ED Course  ED Course as of 12/12/22 1609   Mon Dec 12, 2022   1602 Urinalysis completely negative. [KZ]   1608 Patient's exam is benign.  No rebound or guarding.  Advised supportive care.  She has a follow-up appointment tomorrow.  Previous  appendectomy, so nothing surgical concerning in the lower abdomen. [KZ]      ED Course User Index  [KZ] Nick Grider MD                                           [facility]    Final diagnoses:   Lower abdominal pain       ED Disposition  ED Disposition     ED Disposition   Discharge    Condition   Stable    Comment   --             Your physician    In 1 day  As scheduled.         Medication List      New Prescriptions    dicyclomine 20 MG tablet  Commonly known as: BENTYL  Take 1 tablet by mouth Every 8 (Eight) Hours As Needed (abdominal pain).     ibuprofen 800 MG tablet  Commonly known as: ADVIL,MOTRIN  Take 1 tablet by mouth Every 8 (Eight) Hours As Needed for Mild Pain or Moderate Pain.           Where to Get Your Medications      These medications were sent to [facility] [id number] - [facility] [place] - [address] [facility] - [phone]  - [phone] [address] [place] [address]    Phone: [phone]   · dicyclomine 20 MG tablet  · ibuprofen 800 MG tablet

## 2022-12-12 NOTE — DISCHARGE INSTRUCTIONS
Try Bentyl and/or ibuprofen as needed for pain.  Follow-up with your doctor tomorrow as scheduled.  Return to ER for any concerns.
